# Patient Record
Sex: MALE | Race: WHITE | Employment: FULL TIME | URBAN - NONMETROPOLITAN AREA
[De-identification: names, ages, dates, MRNs, and addresses within clinical notes are randomized per-mention and may not be internally consistent; named-entity substitution may affect disease eponyms.]

---

## 2019-07-17 ENCOUNTER — TELEPHONE (OUTPATIENT)
Dept: FAMILY MEDICINE CLINIC | Age: 24
End: 2019-07-17

## 2019-08-22 ENCOUNTER — HOSPITAL ENCOUNTER (OUTPATIENT)
Age: 24
Discharge: HOME | End: 2019-08-22
Payer: COMMERCIAL

## 2019-08-22 ENCOUNTER — HOSPITAL ENCOUNTER (OUTPATIENT)
Dept: GENERAL RADIOLOGY | Age: 24
Discharge: HOME | End: 2019-08-22
Payer: COMMERCIAL

## 2019-08-22 DIAGNOSIS — S39.012A STRAIN OF LUMBAR REGION, INITIAL ENCOUNTER: ICD-10-CM

## 2019-08-22 PROCEDURE — 72100 X-RAY EXAM L-S SPINE 2/3 VWS: CPT

## 2021-01-06 ENCOUNTER — HOSPITAL ENCOUNTER (EMERGENCY)
Age: 26
Discharge: HOME | End: 2021-01-06
Payer: COMMERCIAL

## 2021-01-06 ENCOUNTER — APPOINTMENT (OUTPATIENT)
Dept: GENERAL RADIOLOGY | Age: 26
End: 2021-01-06
Payer: COMMERCIAL

## 2021-01-06 VITALS
WEIGHT: 110 LBS | HEART RATE: 84 BPM | HEIGHT: 63 IN | RESPIRATION RATE: 14 BRPM | BODY MASS INDEX: 19.49 KG/M2 | DIASTOLIC BLOOD PRESSURE: 87 MMHG | TEMPERATURE: 98.5 F | OXYGEN SATURATION: 98 % | SYSTOLIC BLOOD PRESSURE: 130 MMHG

## 2021-01-06 DIAGNOSIS — S80.01XA CONTUSION OF RIGHT KNEE, INITIAL ENCOUNTER: ICD-10-CM

## 2021-01-06 DIAGNOSIS — S83.91XA SPRAIN OF RIGHT KNEE, UNSPECIFIED LIGAMENT, INITIAL ENCOUNTER: ICD-10-CM
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
## 2021-01-06 PROCEDURE — 99284 EMERGENCY DEPT VISIT MOD MDM: CPT

## 2021-01-06 PROCEDURE — 6370000000 HC RX 637 (ALT 250 FOR IP): Performed by: PHYSICIAN ASSISTANT

## 2021-01-06 PROCEDURE — 73562 X-RAY EXAM OF KNEE 3: CPT

## 2021-01-06 RX ORDER — IBUPROFEN 800 MG/1
800 TABLET ORAL ONCE
Status: COMPLETED | OUTPATIENT
Start: 2021-01-06 | End: 2021-01-06

## 2021-01-06 RX ADMIN — IBUPROFEN 800 MG: 800 TABLET, FILM COATED ORAL at 14:28

## 2021-01-06 ASSESSMENT — PAIN SCALES - GENERAL
PAINLEVEL_OUTOF10: 3
PAINLEVEL_OUTOF10: 6
PAINLEVEL_OUTOF10: 6

## 2021-01-06 ASSESSMENT — PAIN DESCRIPTION - LOCATION: LOCATION: NECK

## 2021-01-06 ASSESSMENT — PAIN DESCRIPTION - ORIENTATION: ORIENTATION: RIGHT

## 2021-01-06 NOTE — ED NOTES
Discharge instructions reviewed with patient. Patient denies any questions or concerns at this time. Patient ambulatory out of emergency department.       Breanne Christopher RN  01/06/21 1080

## 2021-01-06 NOTE — LETTER
[facility]  [address] [address]  Phone: [phone]               January 6, 2021    Patient: Ciara Nash   YOB: 1995   Date of Visit: 1/6/2021       To Whom It May Concern:    Maria Eugenia Parr was seen and treated in our emergency department on 1/6/2021.        Sincerely,       Jn Banda RN         Signature:__________________________________

## 2021-01-07 ENCOUNTER — TELEPHONE (OUTPATIENT)
Dept: ORTHOPEDIC SURGERY | Age: 26
End: 2021-01-07

## 2021-01-07 ASSESSMENT — VISUAL ACUITY: OU: 1
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
## 2021-01-07 NOTE — TELEPHONE ENCOUNTER
Spoke to patient to offer appt at [facility] office on 1/8 with Dr Shruthi Castellanos as he was notified yesterday on call of patient's ER visit for knee injury. Offered patient either 10:15 or 3:15 appt time. He stated he will call back to let us know if he will take either appt.

## 2021-01-12 ENCOUNTER — OFFICE VISIT (OUTPATIENT)
Dept: ORTHOPEDIC SURGERY | Age: 26
End: 2021-01-12
Payer: COMMERCIAL

## 2021-01-12 VITALS — BODY MASS INDEX: 19.49 KG/M2 | WEIGHT: 110 LBS | HEIGHT: 63 IN

## 2021-01-12 DIAGNOSIS — M25.561 RIGHT KNEE PAIN, UNSPECIFIED CHRONICITY: Primary | ICD-10-CM

## 2021-01-12 PROCEDURE — 99243 OFF/OP CNSLTJ NEW/EST LOW 30: CPT | Performed by: ORTHOPAEDIC SURGERY

## 2021-01-12 NOTE — LETTER
[address] [place] [id number]  Phone: [phone]  Fax: [phone]    Tiesha Andarde MD        January 12, 2021     Patient: Lázaro Parish   YOB: 1995   Date of Visit: 1/12/2021       To Whom it May Concern:    Brianne Andersen was seen in my clinic on 1/12/2021. Due to his knee pain he is to remain off of work through 1/19/21. Will see him back to determine the next step in his plan of care. If you have any questions or concerns, please don't hesitate to call.     Sincerely,           Tiesha Andrade MD
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
## 2021-01-19 ENCOUNTER — OFFICE VISIT (OUTPATIENT)
Dept: ORTHOPEDIC SURGERY | Age: 26
End: 2021-01-19
Payer: COMMERCIAL

## 2021-01-19 VITALS — HEIGHT: 63 IN | WEIGHT: 110 LBS | BODY MASS INDEX: 19.49 KG/M2

## 2021-01-19 DIAGNOSIS — M25.561 RIGHT KNEE PAIN, UNSPECIFIED CHRONICITY: Primary | ICD-10-CM

## 2021-01-19 PROCEDURE — 99213 OFFICE O/P EST LOW 20 MIN: CPT | Performed by: ORTHOPAEDIC SURGERY

## 2021-01-19 NOTE — LETTER
[address] [place] [id number]  Phone: [phone]  Fax: [phone]    Tyrone Phillips MD        January 19, 2021     Patient: Sharon Morrow   YOB: 1995   Date of Visit: 1/19/2021       To Whom it May Concern:    Da Mancera was seen in my clinic on 1/19/2021. Due to his knee pain he requires and MRI, and he should remain off work until seen back to review results of MRI. If you have any questions or concerns, please don't hesitate to call.     Sincerely,         Tyrone Phillips MD
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
## 2021-01-20 ENCOUNTER — TELEPHONE (OUTPATIENT)
Dept: ORTHOPEDIC SURGERY | Age: 26
End: 2021-01-20

## 2021-01-25 ENCOUNTER — TELEPHONE (OUTPATIENT)
Dept: ORTHOPEDIC SURGERY | Age: 26
End: 2021-01-25

## 2021-01-26 ENCOUNTER — TELEPHONE (OUTPATIENT)
Dept: ORTHOPEDIC SURGERY | Age: 26
End: 2021-01-26

## 2021-01-29 ENCOUNTER — OFFICE VISIT (OUTPATIENT)
Dept: ORTHOPEDIC SURGERY | Age: 26
End: 2021-01-29
Payer: COMMERCIAL

## 2021-01-29 VITALS — WEIGHT: 110 LBS | HEIGHT: 63 IN | BODY MASS INDEX: 19.49 KG/M2

## 2021-01-29 DIAGNOSIS — S82.001D CLOSED NONDISPLACED FRACTURE OF RIGHT PATELLA WITH ROUTINE HEALING, UNSPECIFIED FRACTURE MORPHOLOGY, SUBSEQUENT ENCOUNTER: Primary | ICD-10-CM

## 2021-01-29 PROCEDURE — 99213 OFFICE O/P EST LOW 20 MIN: CPT | Performed by: ORTHOPAEDIC SURGERY

## 2021-01-29 NOTE — LETTER
[address] [place] [address] [id number]  Phone: [phone]  Fax: [phone]    Patience Barillas MD        January 29, 2021     Patient: Dimitris Ansari   YOB: 1995   Date of Visit: 1/29/2021       To Whom it May Concern:    Sekou Carranza was seen in my clinic on 1/29/2021. He may return to work on 1/1/21 full duty no restrictions. If you have any questions or concerns, please don't hesitate to call.     Sincerely,           Patience Barillas MD

## 2021-01-29 NOTE — PROGRESS NOTES
Chief Complaint  Follow-up (Right knee: MRI Review )      History of Present Illness:   Stevie Drake is a 31 y.o. y/o male who presents today for follow up of his right knee. He is here today to review his MRI. He has been feeling a little bit better with some pain in the anterior aspect of his knee especially with any kneeling. No new falls or acute trauma.       Medical History  Past Medical History:   Diagnosis Date    ADD (attention deficit disorder)     Back pain, chronic        Past Surgical History:   Procedure Laterality Date    ADENOIDECTOMY      TYMPANOSTOMY TUBE PLACEMENT         Social History     Socioeconomic History    Marital status:      Spouse name: Not on file    Number of children: Not on file    Years of education: Not on file    Highest education level: Not on file   Occupational History    Not on file   Social Needs    Financial resource strain: Not on file    Food insecurity     Worry: Not on file     Inability: Not on file    Transportation needs     Medical: Not on file     Non-medical: Not on file   Tobacco Use    Smoking status: Never Smoker    Smokeless tobacco: Current User     Types: Chew   Substance and Sexual Activity    Alcohol use: Yes     Comment: occassionally    Drug use: No    Sexual activity: Not on file   Lifestyle    Physical activity     Days per week: Not on file     Minutes per session: Not on file    Stress: Not on file   Relationships    Social connections     Talks on phone: Not on file     Gets together: Not on file     Attends [employer] service: Not on file     Active member of club or organization: Not on file     Attends meetings of clubs or organizations: Not on file     Relationship status: Not on file    Intimate partner violence     Fear of current or ex partner: Not on file     Emotionally abused: Not on file     Physically abused: Not on file     Forced sexual activity: Not on file   Other Topics Concern    Not on file   Social FINDINGS:  Articular sided nondisplaced osteochondral fracture of the median ridge-medial facet    junction inferior aspect of the patella.  No underlying cartilage fracture.  Borderline    patella jack is present.  Cartilaginous surfaces of the trochlea are unremarkable.       Mild contusion of the posterolateral tibial plateau.       Medial and lateral menisci are intact.       No chondral injury or chondromalacia in the tibiofemoral compartments.       ACL, PCL, MCL and LCL are intact.  No high-grade or recent injury in the posterolateral or    posteromedial corner structures.       Trace effusion is present.  No loose bodies are identified.       Musculature and neurovascular bundles are unremarkable.       CONCLUSION:   1. Articular sided nondisplaced osteochondral fracture of the median ridge-medial facet    junction inferior aspect of the patella. No underlying cartilage fracture. 2. Mild contusion of the posterolateral tibial plateau. 3. Trace effusion.  No loose bodies. 4. No meniscus tear. Assessment:  76-year-old male with an impaction fracture of the right patella    Office Procedures:  No orders of the defined types were placed in this encounter. Plan:   -We discussed at this point he is feeling better. His fracture is an impaction injury no complete fracture. It is safe for him to work if he is comfortable and he feels like this time he is able to perform his normal duties  Return to work  -We discussed using a knee pad or some kind of assistive device to help with healing if needed  -We discussed there could be long-term consequences down the road since he did have an osteochondral impaction injury, however this would likely take years for development  -If he continues having significant symptoms we will see him back in 6 weeks and if there are issues interim he may contact the office    MD Jayme Tapia 57 partner of [address] [facility]    Voice Recognition Dictation disclaimer: Please note that portions of this chart were generated using Dragon dictation software. Although every effort was made to ensure the accuracy of this automated transcription, some errors in transcription may have occurred.

## 2021-03-04 ENCOUNTER — TELEPHONE (OUTPATIENT)
Dept: ORTHOPEDIC SURGERY | Age: 26
End: 2021-03-04

## 2021-03-04 NOTE — TELEPHONE ENCOUNTER
[facility] / [facility] ( EARLY ) 01/06/2021 TO PRESENT TO [facility] TO SCAN IN [facility] TO [address]

## 2024-06-11 ENCOUNTER — HOSPITAL ENCOUNTER (EMERGENCY)
Age: 29
Discharge: HOME | End: 2024-06-11
Attending: STUDENT IN AN ORGANIZED HEALTH CARE EDUCATION/TRAINING PROGRAM
Payer: MEDICAID

## 2024-06-11 ENCOUNTER — APPOINTMENT (OUTPATIENT)
Dept: GENERAL RADIOLOGY | Age: 29
End: 2024-06-11
Payer: MEDICAID

## 2024-06-11 VITALS
OXYGEN SATURATION: 100 % | SYSTOLIC BLOOD PRESSURE: 127 MMHG | TEMPERATURE: 97.9 F | HEART RATE: 60 BPM | DIASTOLIC BLOOD PRESSURE: 82 MMHG | BODY MASS INDEX: 22.15 KG/M2 | RESPIRATION RATE: 18 BRPM | HEIGHT: 63 IN | WEIGHT: 125 LBS

## 2024-06-11 DIAGNOSIS — S39.012A BACK STRAIN, INITIAL ENCOUNTER: Primary | ICD-10-CM

## 2024-06-11 PROCEDURE — 96372 THER/PROPH/DIAG INJ SC/IM: CPT

## 2024-06-11 PROCEDURE — 99284 EMERGENCY DEPT VISIT MOD MDM: CPT

## 2024-06-11 PROCEDURE — 6370000000 HC RX 637 (ALT 250 FOR IP): Performed by: STUDENT IN AN ORGANIZED HEALTH CARE EDUCATION/TRAINING PROGRAM

## 2024-06-11 PROCEDURE — 72100 X-RAY EXAM L-S SPINE 2/3 VWS: CPT

## 2024-06-11 PROCEDURE — 6360000002 HC RX W HCPCS: Performed by: STUDENT IN AN ORGANIZED HEALTH CARE EDUCATION/TRAINING PROGRAM

## 2024-06-11 RX ORDER — KETOROLAC TROMETHAMINE 30 MG/ML
30 INJECTION, SOLUTION INTRAMUSCULAR; INTRAVENOUS ONCE
Status: COMPLETED | OUTPATIENT
Start: 2024-06-11 | End: 2024-06-11

## 2024-06-11 RX ORDER — ACETAMINOPHEN 500 MG
1000 TABLET ORAL
Status: COMPLETED | OUTPATIENT
Start: 2024-06-11 | End: 2024-06-11

## 2024-06-11 RX ADMIN — ACETAMINOPHEN 1000 MG: 500 TABLET ORAL at 07:33

## 2024-06-11 RX ADMIN — KETOROLAC TROMETHAMINE 30 MG: 30 INJECTION, SOLUTION INTRAMUSCULAR at 07:32

## 2024-06-11 ASSESSMENT — PAIN SCALES - GENERAL
PAINLEVEL_OUTOF10: 6
PAINLEVEL_OUTOF10: 4

## 2024-06-11 ASSESSMENT — PAIN DESCRIPTION - ORIENTATION
ORIENTATION: RIGHT;LOWER
ORIENTATION: RIGHT

## 2024-06-11 ASSESSMENT — PAIN DESCRIPTION - PAIN TYPE: TYPE: ACUTE PAIN

## 2024-06-11 ASSESSMENT — PAIN DESCRIPTION - LOCATION
LOCATION: BACK
LOCATION: BACK

## 2024-06-11 ASSESSMENT — PAIN DESCRIPTION - DESCRIPTORS: DESCRIPTORS: ACHING

## 2024-06-11 ASSESSMENT — PAIN - FUNCTIONAL ASSESSMENT: PAIN_FUNCTIONAL_ASSESSMENT: 0-10

## 2024-06-11 NOTE — DISCHARGE INSTRUCTIONS
Return to nearest ED if you develop severe worsening pain, new numbness and tingling, or if you pee or poop on yourself.  You can take 600 mg of ibuprofen every 6 hours and 1000 mg of acetaminophen every 8 hours for pain.  Follow-up with PCP in 2 to 3 days.  If you are not improving with physical therapy after a few weeks, he should follow-up with orthopedic spine surgery.

## 2024-06-11 NOTE — ED PROVIDER NOTES
using Dragon dictation software.  Efforts were made by me to ensure accuracy, however some errors may be present due to limitations of this technology and occasionally words are not transcribed correctly.      Wolfgang Albarado,   06/11/24 0808

## 2024-06-13 ENCOUNTER — HOSPITAL ENCOUNTER (EMERGENCY)
Age: 29
Discharge: HOME | End: 2024-06-13
Attending: STUDENT IN AN ORGANIZED HEALTH CARE EDUCATION/TRAINING PROGRAM
Payer: MEDICAID

## 2024-06-13 VITALS
OXYGEN SATURATION: 99 % | BODY MASS INDEX: 18.6 KG/M2 | WEIGHT: 105 LBS | TEMPERATURE: 98.2 F | RESPIRATION RATE: 18 BRPM | SYSTOLIC BLOOD PRESSURE: 129 MMHG | HEART RATE: 68 BPM | DIASTOLIC BLOOD PRESSURE: 89 MMHG

## 2024-06-13 DIAGNOSIS — L03.116 CELLULITIS OF LEFT LOWER EXTREMITY: Primary | ICD-10-CM

## 2024-06-13 DIAGNOSIS — A69.20 ERYTHEMA MIGRANS (LYME DISEASE): ICD-10-CM

## 2024-06-13 PROCEDURE — 99283 EMERGENCY DEPT VISIT LOW MDM: CPT

## 2024-06-13 PROCEDURE — 6370000000 HC RX 637 (ALT 250 FOR IP): Performed by: STUDENT IN AN ORGANIZED HEALTH CARE EDUCATION/TRAINING PROGRAM

## 2024-06-13 RX ORDER — DOXYCYCLINE HYCLATE 100 MG
100 TABLET ORAL ONCE
Status: COMPLETED | OUTPATIENT
Start: 2024-06-13 | End: 2024-06-13

## 2024-06-13 RX ORDER — DOXYCYCLINE HYCLATE 100 MG
100 TABLET ORAL 2 TIMES DAILY
Qty: 20 TABLET | Refills: 0 | Status: SHIPPED | OUTPATIENT
Start: 2024-06-13 | End: 2024-06-23

## 2024-06-13 RX ADMIN — DOXYCYCLINE HYCLATE 100 MG: 100 TABLET, COATED ORAL at 22:27

## 2024-06-13 ASSESSMENT — PAIN DESCRIPTION - DESCRIPTORS: DESCRIPTORS: OTHER (COMMENT)

## 2024-06-13 ASSESSMENT — PAIN - FUNCTIONAL ASSESSMENT: PAIN_FUNCTIONAL_ASSESSMENT: 0-10

## 2024-06-13 ASSESSMENT — PAIN DESCRIPTION - LOCATION: LOCATION: LEG

## 2024-06-13 ASSESSMENT — PAIN SCALES - GENERAL: PAINLEVEL_OUTOF10: 2

## 2024-06-14 NOTE — ED PROVIDER NOTES
[facility] ED  EMERGENCY DEPARTMENT ENCOUNTER        Patient Name: Chente Guzmán  MRN: [id number]  Birthdate 1995  Date of evaluation: 6/13/2024  Provider: Zoraida Cervantes MD  PCP: No primary care provider on file.  Note Started: 11:54 PM EDT 6/13/24      CHIEF COMPLAINT  Leg pain         HISTORY & PHYSICAL     HISTORY OF PRESENT ILLNESS  History from : Patient    Limitations to history : None    Chente Guzmán is a 28 y.o. male  has a past medical history of ADD (attention deficit disorder) and Back pain, chronic., who presents to the ED complaining of right thigh pain.  Patient has an area of erythema on his right thigh that he states has been increasing in size.  He reports today he was able to squeeze some purulent discharge out of the central area.  It does appear to be a bull's-eye.  Patient denies any known tick bite but does report that he has been out in the woods.  No numbness, weakness, tingling.  No fever.  2/10, aching pain, no palliative or provocative factors.      Old records reviewed: No pertinent information noted.    No other complaints, modifying factors or associated symptoms.  Nursing Notes were all reviewed and agreed with or any disagreements were addressed in the HPI.    I have reviewed the following from the nursing documentation.    Past Medical History:   Diagnosis Date    ADD (attention deficit disorder)     Back pain, chronic      Past Surgical History:   Procedure Laterality Date    ADENOIDECTOMY      TYMPANOSTOMY TUBE PLACEMENT       No family history on file.  Social History     Socioeconomic History    Marital status:      Spouse name: Not on file    Number of children: Not on file    Years of education: Not on file    Highest education level: Not on file   Occupational History    Not on file   Tobacco Use    Smoking status: Never    Smokeless tobacco: Current     Types: Chew   Substance and Sexual Activity    Alcohol use: Yes

## 2024-06-14 NOTE — DISCHARGE INSTRUCTIONS
The findings on your left leg appear to be a rash that is consistent with Lyme disease.  However it also could be skin infection from another cause.  You have been started on antibiotics.  Please take them as prescribed.  Please follow-up with primary care.  Please return the emergency department if you have any new or worsening symptoms.

## 2024-09-05 ENCOUNTER — HOSPITAL ENCOUNTER (EMERGENCY)
Age: 29
Discharge: HOME | End: 2024-09-05

## 2024-09-05 VITALS
HEIGHT: 63 IN | WEIGHT: 108 LBS | TEMPERATURE: 98.4 F | DIASTOLIC BLOOD PRESSURE: 88 MMHG | BODY MASS INDEX: 19.14 KG/M2 | HEART RATE: 60 BPM | SYSTOLIC BLOOD PRESSURE: 130 MMHG | RESPIRATION RATE: 17 BRPM | OXYGEN SATURATION: 100 %

## 2024-09-05 DIAGNOSIS — J06.9 ACUTE UPPER RESPIRATORY INFECTION: Primary | ICD-10-CM

## 2024-09-05 LAB
FLUAV RNA RESP QL NAA+PROBE: NOT DETECTED
FLUBV RNA RESP QL NAA+PROBE: NOT DETECTED
SARS-COV-2 RNA RESP QL NAA+PROBE: NOT DETECTED

## 2024-09-05 PROCEDURE — 99283 EMERGENCY DEPT VISIT LOW MDM: CPT

## 2024-09-05 PROCEDURE — 87636 SARSCOV2 & INF A&B AMP PRB: CPT

## 2024-09-05 RX ORDER — FLUTICASONE PROPIONATE 50 MCG
2 SPRAY, SUSPENSION (ML) NASAL DAILY
Qty: 16 G | Refills: 0 | Status: SHIPPED | OUTPATIENT
Start: 2024-09-05

## 2024-09-05 ASSESSMENT — PAIN - FUNCTIONAL ASSESSMENT: PAIN_FUNCTIONAL_ASSESSMENT: NONE - DENIES PAIN

## 2024-09-05 NOTE — ED PROVIDER NOTES
[facility] ED  EMERGENCY DEPARTMENT ENCOUNTER        Pt Name: Chente Guzmán  MRN: [id number]  Birthdate 1995  Date of evaluation: 9/5/2024  Provider: ROBINSON Vega CNP  PCP: No primary care provider on file.  Note Started: 12:25 PM EDT 9/5/24      TARIQ. I have evaluated this patient.        CHIEF COMPLAINT       Chief Complaint   Patient presents with    Illness     Pt arrives for diarrhea for 3 days, states it started getting better today, runny nose, headache, nausea.        HISTORY OF PRESENT ILLNESS: 1 or more Elements     History From: Patient     Limitations to history : None    Social Determinants Significantly Affecting Health : None    Chief Complaint: Cough     Chente Guzmán is a 28 y.o. male who presents to the emergency department today with symptoms of cough, congestion, runny nose.  Started with symptoms 3 days ago.  Was at work today and is not feeling well.  He was encouraged come the hospital to be evaluated.  States that he needed to leave work and needed a doctor's note.  He denied any neck pain or back pain.  No fevers no chills.  No other acute complaints at this time.  States otherwise feeling well.    Nursing Notes were all reviewed and agreed with or any disagreements were addressed in the HPI.    REVIEW OF SYSTEMS :      Review of Systems    Positives and Pertinent negatives as per HPI.     SURGICAL HISTORY     Past Surgical History:   Procedure Laterality Date    ADENOIDECTOMY      TYMPANOSTOMY TUBE PLACEMENT         CURRENTMEDICATIONS       Previous Medications    ACETAMINOPHEN (APAP EXTRA STRENGTH) 500 MG TABLET    Take 2 tablets by mouth every 6 hours as needed for Pain DO NOT TAKE WITH OTHER MEDICATIONS CONTAINING ACETAMINOPHEN.    LIDOCAINE (LIDODERM) 5 %    Place 1 patch onto the skin daily 12 hours on, 12 hours off.  **May substitute OTC patches if Rx not covered by insurance**    NAPROXEN (NAPROSYN) 375 MG TABLET    Take 1 tablet by  mouth 2 times daily as needed for Pain       ALLERGIES     Morphine, Ritalin [methylphenidate hcl], and Robitussin [guaifenesin]    FAMILYHISTORY     History reviewed. No pertinent family history.     SOCIAL HISTORY       Social History     Tobacco Use    Smoking status: Never    Smokeless tobacco: Current     Types: Chew   Substance Use Topics    Alcohol use: Not Currently     Comment: occassionally    Drug use: Yes     Types: Marijuana (Weed)       SCREENINGS          [place] Coma Scale  Eye Opening: Spontaneous  Best Verbal Response: Oriented  Best Motor Response: Obeys commands  Lynette Coma Scale Score: 15              CIWA Assessment  BP: (!) 132/91  Pulse: 66             PHYSICAL EXAM  1 or more Elements     ED Triage Vitals [09/05/24 1142]   BP Systolic BP Percentile Diastolic BP Percentile Temp Temp Source Pulse Respirations SpO2   (!) 132/91 -- -- 98.4 °F (36.9 °C) Oral 66 18 100 %      Height Weight - Scale         1.6 m (5' 3\") 49 kg (108 lb)             Physical Exam  Vitals and nursing note reviewed.   Constitutional:       Appearance: Normal appearance. He is not toxic-appearing or diaphoretic.   HENT:      Head: Normocephalic and atraumatic.      Nose: Nose normal.   Eyes:      General:         Right eye: No discharge.         Left eye: No discharge.   Cardiovascular:      Rate and Rhythm: Normal rate and regular rhythm.      Pulses: Normal pulses.      Heart sounds: No murmur heard.  Pulmonary:      Effort: Pulmonary effort is normal. No respiratory distress.      Breath sounds: No wheezing or rhonchi.   Abdominal:      Palpations: Abdomen is soft.      Tenderness: There is no abdominal tenderness. There is no guarding or rebound.   Musculoskeletal:         General: Normal range of motion.      Cervical back: Normal range of motion and neck supple.   Skin:     General: Skin is warm and dry.      Capillary Refill: Capillary refill takes less than 2 seconds.   Neurological:      General: No focal

## 2024-09-05 NOTE — ED NOTES
Discharge instructions gone over, patient denies any further questions at this time. Ambulated to car without difficulty